# Patient Record
Sex: FEMALE | Race: WHITE | NOT HISPANIC OR LATINO | Employment: UNEMPLOYED | URBAN - METROPOLITAN AREA
[De-identification: names, ages, dates, MRNs, and addresses within clinical notes are randomized per-mention and may not be internally consistent; named-entity substitution may affect disease eponyms.]

---

## 2017-11-04 ENCOUNTER — HOSPITAL ENCOUNTER (EMERGENCY)
Facility: HOSPITAL | Age: 37
Discharge: HOME | End: 2017-11-05
Payer: COMMERCIAL

## 2017-11-04 VITALS
HEART RATE: 110 BPM | TEMPERATURE: 98.4 F | RESPIRATION RATE: 18 BRPM | OXYGEN SATURATION: 99 % | SYSTOLIC BLOOD PRESSURE: 141 MMHG | DIASTOLIC BLOOD PRESSURE: 92 MMHG

## 2017-11-04 DIAGNOSIS — J06.9 URI (UPPER RESPIRATORY INFECTION): ICD-10-CM

## 2017-11-04 DIAGNOSIS — L73.9 FOLLICULITIS: Primary | ICD-10-CM

## 2017-11-04 RX ORDER — CEPHALEXIN 500 MG/1
500 CAPSULE ORAL EVERY 6 HOURS SCHEDULED
Qty: 28 CAPSULE | Refills: 0 | Status: SHIPPED | OUTPATIENT
Start: 2017-11-04 | End: 2017-11-11

## 2017-11-05 PROCEDURE — 99282 EMERGENCY DEPT VISIT SF MDM: CPT

## 2017-11-05 NOTE — DISCHARGE INSTRUCTIONS
Folliculitis   WHAT YOU NEED TO KNOW:   Folliculitis is inflammation of your hair follicles  A hair follicle is a sac under your skin  Your hair grows out of the follicle  Folliculitis is caused by bacteria or fungus, most commonly a germ called Staph  Folliculitis can occur anywhere you have hair  DISCHARGE INSTRUCTIONS:   Medicines:   · Antibiotics: This medicine is given to fight or prevent an infection caused by bacteria  It may be given as an ointment that you apply to your skin or as a pill  Always take your antibiotics exactly as ordered by your healthcare provider  Never save antibiotics or take leftover antibiotics that were given to you for another illness  · Antifungal medicine: This medicine helps kill fungus that may be causing your folliculitis  It may be given as an cream that you apply to your skin or as a pill  · NSAIDs , such as ibuprofen, help decrease swelling, pain, and fever  This medicine is available with or without a doctor's order  NSAIDs can cause stomach bleeding or kidney problems in certain people  If you take blood thinner medicine, always ask if NSAIDs are safe for you  Always read the medicine label and follow directions  Do not give these medicines to children under 10months of age without direction from your child's healthcare provider  · Antihistamines: This medicine may be given to help decrease itching  · Take your medicine as directed  Contact your healthcare provider if you think your medicine is not helping or if you have side effects  Tell him of her if you are allergic to any medicine  Keep a list of the medicines, vitamins, and herbs you take  Include the amounts, and when and why you take them  Bring the list or the pill bottles to follow-up visits  Carry your medicine list with you in case of an emergency    Follow up with your healthcare provider or dermatologist as directed:  Write down your questions so you remember to ask them during your visits  Manage folliculitis:   · Use warm compresses:  Wet a washcloth with warm water and apply it to the infected skin area to help decrease pain and swelling  Warm compresses may also help drain pus and improve healing  · Clean the area:  Use antibacterial soap to wash the affected area  Change your washcloths and towels every day  · Avoid shaving the area: If possible, do not shave areas that have folliculitis  If you must shave, use an electric razor or new blade every time you shave  Prevent folliculitis:   · Do not share personal items:  Do not share towels, soap, or any personal items with other people  · Do not wear tight clothing:  Do not wear tight-fitting clothes that rub against and irritate your skin  · Treat skin injuries right away:  Treat injuries such as cuts and scrapes right away  Wash them with warm, soapy water, and cover the area to prevent infection  Contact your healthcare provider or dermatologist if:  · You have a fever  · You have foul-smelling pus coming from the bumps on your skin  · Your rash is spreading  · You have questions or concerns about your condition or care  Return to the emergency department if:  · You develop large areas of red, warm, tender skin around the folliculitis  · You develop boils  © 2017 [address] Information is for End User's use only and may not be sold, redistributed or otherwise used for commercial purposes  All illustrations and images included in CareNotes® are the copyrighted property of [employer]  or [address]  The above information is an  only  It is not intended as medical advice for individual conditions or treatments  Talk to your doctor, nurse or pharmacist before following any medical regimen to see if it is safe and effective for you

## 2017-11-05 NOTE — ED PROVIDER NOTES
History  Chief Complaint   Patient presents with    Rash     Pt  states red bumps all over body, states noticed yesterday   Nasal Congestion     Nasal drainage, bright yellow  Also complains of head congestion  Also dry cough       40 yo F presents today c/o rash x 2 days  States she noticed the rash incidentally on her chest while showering yesterday morning  Also noticed rash on arms, distal lower legs and back  It has improved and is less erythematous since onset  Mildly itchy, not painful  No drainage  She has not attempted any alleviating factors  Denies changes to soaps lotions or detergents  Has dogs at home and is concerned for fleas, stating she has had fleas in the past  Did not notice any bed bugs but did change and wash sheets at home  Denies contacts at home with similar rash  No history of similar symptoms  Also c/o nasal congestion x 2 days with sinus pressure and yellow nasal drainage and dry cough  Has been using OTC cough medications and taking ibuprofen with relief  No fevers, chills, CP, SOB, abd pain, n/v/d  No history of asthma but is a smoker  None       History reviewed  No pertinent past medical history  History reviewed  No pertinent surgical history  History reviewed  No pertinent family history  I have reviewed and agree with the history as documented  Social History   Substance Use Topics    Smoking status: Current Every Day Smoker     Packs/day: 0 50    Smokeless tobacco: Never Used    Alcohol use No        Review of Systems   Constitutional: Negative for chills and fever  HENT: Positive for congestion, rhinorrhea and sinus pressure  Negative for ear discharge, ear pain, sore throat and trouble swallowing  Respiratory: Positive for cough  Negative for chest tightness, shortness of breath and wheezing  Cardiovascular: Negative for chest pain and palpitations  Gastrointestinal: Negative for abdominal pain, diarrhea, nausea and vomiting  Musculoskeletal: Negative for neck pain and neck stiffness  Skin: Positive for rash  Negative for color change and wound  Neurological: Negative for weakness and numbness  Physical Exam  ED Triage Vitals [11/04/17 2321]   Temperature Pulse Respirations Blood Pressure SpO2   98 4 °F (36 9 °C) (!) 110 18 (!) 181/101 99 %      Temp Source Heart Rate Source Patient Position - Orthostatic VS BP Location FiO2 (%)   Oral Monitor Sitting Right arm --      Pain Score       No Pain           Orthostatic Vital Signs  Vitals:    11/04/17 2321 11/04/17 2347   BP: (!) 181/101 141/92   Pulse: (!) 110    Patient Position - Orthostatic VS: Sitting Sitting       Physical Exam   Constitutional: She is oriented to person, place, and time  She appears well-developed and well-nourished  She is cooperative  Non-toxic appearance  She does not have a sickly appearance  She does not appear ill  She appears distressed (anxious)  HENT:   Head: Normocephalic and atraumatic  Right Ear: Hearing, tympanic membrane, external ear and ear canal normal    Left Ear: Hearing, tympanic membrane, external ear and ear canal normal    Nose: Mucosal edema present  No rhinorrhea  Mouth/Throat: Uvula is midline, oropharynx is clear and moist and mucous membranes are normal  No tonsillar exudate  Eyes: Conjunctivae and EOM are normal  Pupils are equal, round, and reactive to light  Neck: Normal range of motion  Neck supple  Cardiovascular: Normal rate, regular rhythm and normal heart sounds  Exam reveals no gallop and no friction rub  No murmur heard  Pulmonary/Chest: Effort normal and breath sounds normal  No respiratory distress  She has no wheezes  She has no rales  Musculoskeletal: Normal range of motion  Neurological: She is alert and oriented to person, place, and time  Skin: Skin is warm and dry  Capillary refill takes less than 2 seconds  Rash noted  No petechiae noted  Rash is papular and pustular   Rash is not vesicular  She is not diaphoretic  There is erythema  Erythematous pustular rash noted on chest, upper back, right extremity and distal lower extremities; abdomen spared; no drainage, no abscess, no streaking, no vesicles   Psychiatric: Her speech is normal and behavior is normal  Her mood appears anxious  Nursing note and vitals reviewed  ED Medications  Medications - No data to display    Diagnostic Studies  Results Reviewed     None                 No orders to display              Procedures  Procedures       Phone Contacts  ED Phone Contact    ED Course  ED Course                                MDM  Number of Diagnoses or Management Options  Folliculitis: new and does not require workup  URI (upper respiratory infection): new and does not require workup  Diagnosis management comments:   40 yo F presents with rash x 2 days  Appears c/w with folliculitis, will tx with keflex  Also states she has had nasal congestion x 2 days with yellow rhinorrhea and dry cough  No fevers  Recommended supportive care, rest, fluids, OTC cold/cough medications, saline  F/u with PCP, RTED precautions given  Patient verbalizes understanding and agrees with plan  Patient Progress  Patient progress: stable    CritCare Time    Disposition  Final diagnoses: Folliculitis   URI (upper respiratory infection)     Time reflects when diagnosis was documented in both MDM as applicable and the Disposition within this note     Time User Action Codes Description Comment    11/4/2017 11:48 PM Michelle Patel Add [Y92 5] Folliculitis     14/5/8041 11:48 PM Alfonzo Patel Add [J06 9] URI (upper respiratory infection)       ED Disposition     ED Disposition Condition Comment    Discharge  Raquel Rico discharge to home/self care      Condition at discharge: Good        Follow-up Information     Follow up With Specialties Details Why Contact Info Additional Information    [facility]  Call [address] [facility] Emergency Department Emergency Medicine  If symptoms worsen - [address] [facility] [place] [address]  [phone] [facility], [address], [place], [place], [address]        Discharge Medication List as of 11/4/2017 11:50 PM      START taking these medications    Details   cephalexin (KEFLEX) 500 mg capsule Take 1 capsule by mouth every 6 (six) hours for 7 days, Starting Sat 11/4/2017, Until Sat 11/11/2017, Print           No discharge procedures on file      ED Provider  Electronically Signed by           Michelle Lebron PA-C  11/05/17 0002

## 2019-02-16 ENCOUNTER — APPOINTMENT (EMERGENCY)
Dept: CT IMAGING | Facility: HOSPITAL | Age: 39
End: 2019-02-16
Payer: COMMERCIAL

## 2019-02-16 ENCOUNTER — HOSPITAL ENCOUNTER (EMERGENCY)
Facility: HOSPITAL | Age: 39
Discharge: LEFT BEFORE DISCHARGE | End: 2019-02-16
Attending: EMERGENCY MEDICINE
Payer: COMMERCIAL

## 2019-02-16 VITALS
OXYGEN SATURATION: 96 % | BODY MASS INDEX: 47.95 KG/M2 | TEMPERATURE: 97.3 F | DIASTOLIC BLOOD PRESSURE: 95 MMHG | RESPIRATION RATE: 20 BRPM | HEART RATE: 114 BPM | HEIGHT: 64 IN | SYSTOLIC BLOOD PRESSURE: 147 MMHG | WEIGHT: 280.87 LBS

## 2019-02-16 DIAGNOSIS — R51.9 HEADACHE: Primary | ICD-10-CM

## 2019-02-16 DIAGNOSIS — S00.411A EAR CANAL ABRASION, RIGHT, INITIAL ENCOUNTER: ICD-10-CM

## 2019-02-16 PROCEDURE — 99283 EMERGENCY DEPT VISIT LOW MDM: CPT

## 2019-02-16 RX ORDER — DIPHENHYDRAMINE HYDROCHLORIDE 50 MG/ML
25 INJECTION INTRAMUSCULAR; INTRAVENOUS ONCE
Status: DISCONTINUED | OUTPATIENT
Start: 2019-02-16 | End: 2019-02-16 | Stop reason: HOSPADM

## 2019-02-16 RX ORDER — KETOROLAC TROMETHAMINE 30 MG/ML
30 INJECTION, SOLUTION INTRAMUSCULAR; INTRAVENOUS ONCE
Status: DISCONTINUED | OUTPATIENT
Start: 2019-02-16 | End: 2019-02-16 | Stop reason: HOSPADM

## 2019-02-16 RX ORDER — METOCLOPRAMIDE HYDROCHLORIDE 5 MG/ML
10 INJECTION INTRAMUSCULAR; INTRAVENOUS ONCE
Status: DISCONTINUED | OUTPATIENT
Start: 2019-02-16 | End: 2019-02-16 | Stop reason: HOSPADM

## 2019-02-16 NOTE — ED PROVIDER NOTES
History  Chief Complaint   Patient presents with    Headache     Patient reports having a headache since Monday  States, "I think it's from this lump behind my right ear "  Also reports dizziness and loss of appetite  No relief with motrin or advil  Patient presents to the emergency department for evaluation right-sided headache that began Monday without relief Tylenol Advil  Patient does not have a headache history  She has had nausea and vomiting  Denies neck pain or stiffness  Denies visual complaints but mentions some dizziness  She denies visual complaints  Denies extremity numbness or tingling  Denies chest pain or shortness of breath  Patient denies fever chills or rash  Patient also states that she developed some right ear bleeding after cleaning with a Q-tip this morning  None       Past Medical History:   Diagnosis Date    Diabetes mellitus ([facility] [address] )        History reviewed  No pertinent surgical history  History reviewed  No pertinent family history  I have reviewed and agree with the history as documented  Social History     Tobacco Use    Smoking status: Current Every Day Smoker     Packs/day: 0 50    Smokeless tobacco: Never Used   Substance Use Topics    Alcohol use: No    Drug use: No        Review of Systems   Constitutional: Negative  Negative for activity change, appetite change, chills, fatigue and fever  HENT: Positive for ear discharge  Negative for congestion, drooling, ear pain, nosebleeds, rhinorrhea, sinus pressure, sinus pain, sneezing, sore throat, trouble swallowing and voice change  Eyes: Negative  Negative for photophobia and visual disturbance  Respiratory: Negative  Negative for cough, choking, chest tightness, shortness of breath, wheezing and stridor  Cardiovascular: Negative  Negative for chest pain, palpitations and leg swelling  Gastrointestinal: Negative    Negative for abdominal distention, abdominal pain, blood in stool, constipation, diarrhea, nausea and vomiting  Endocrine: Negative  Genitourinary: Negative  Negative for difficulty urinating, dysuria, flank pain, frequency, hematuria, urgency, vaginal bleeding, vaginal discharge and vaginal pain  Musculoskeletal: Negative  Negative for arthralgias, back pain, gait problem, joint swelling, myalgias, neck pain and neck stiffness  Skin: Negative  Negative for rash and wound  Allergic/Immunologic: Negative  Neurological: Positive for dizziness  Negative for tremors, seizures, syncope, facial asymmetry, speech difficulty, weakness, light-headedness and numbness  Hematological: Negative  Does not bruise/bleed easily  Psychiatric/Behavioral: Negative  Negative for agitation, behavioral problems and confusion  Physical Exam  Physical Exam   Constitutional: She is oriented to person, place, and time  She appears well-developed and well-nourished  HENT:   Head: Normocephalic and atraumatic  Right Ear: External ear normal    Left Ear: External ear normal    Nose: Nose normal    Mouth/Throat: Oropharynx is clear and moist  No oropharyngeal exudate  Eyes: Pupils are equal, round, and reactive to light  Conjunctivae and EOM are normal  Right eye exhibits discharge  Scant blood in the right ear canal   Tympanic membrane normal   No perforation  Neck:   Right posterior lymph node which is enlarged  Cardiovascular: Normal rate, regular rhythm, normal heart sounds and intact distal pulses  Pulmonary/Chest: Effort normal and breath sounds normal  No stridor  No respiratory distress  She has no wheezes  She has no rales  She exhibits no tenderness  Abdominal: Soft  Bowel sounds are normal  She exhibits no distension and no mass  There is no tenderness  There is no rebound and no guarding  No hernia  Musculoskeletal: Normal range of motion  Lymphadenopathy:     She has cervical adenopathy     Neurological: She is alert and oriented to person, place, and time  She has normal reflexes  She displays normal reflexes  No cranial nerve deficit or sensory deficit  She exhibits normal muscle tone  Coordination normal    Skin: Skin is warm and dry  No rash noted  No erythema  No pallor  Psychiatric: She has a normal mood and affect  Her behavior is normal  Thought content normal    Nursing note and vitals reviewed  Vital Signs  ED Triage Vitals   Temperature Pulse Respirations Blood Pressure SpO2   02/16/19 1448 02/16/19 1446 02/16/19 1446 02/16/19 1446 02/16/19 1446   (!) 97 3 °F (36 3 °C) (!) 114 20 147/95 96 %      Temp Source Heart Rate Source Patient Position - Orthostatic VS BP Location FiO2 (%)   02/16/19 1448 02/16/19 1446 02/16/19 1446 02/16/19 1446 --   Oral Monitor Sitting Left arm       Pain Score       02/16/19 1446       Worst Possible Pain           Vitals:    02/16/19 1446   BP: 147/95   Pulse: (!) 114   Patient Position - Orthostatic VS: Sitting       Visual Acuity      ED Medications  Medications   metoclopramide (REGLAN) injection 10 mg (has no administration in time range)   ketorolac (TORADOL) injection 30 mg (has no administration in time range)   diphenhydrAMINE (BENADRYL) injection 25 mg (has no administration in time range)   sodium chloride 0 9 % bolus 1,000 mL (has no administration in time range)       Diagnostic Studies  Results Reviewed     None                 CT head without contrast    (Results Pending)              Procedures  Procedures       Phone Contacts  ED Phone Contact    ED Course  ED Course as of Feb 16 1637   Sat Feb 16, 2019   1635   Patient left the emergency department without notification to staff or myself  She did not get any of the medications or her or the head CT nor did she get discharge instructions  She did not talk to anyone and is uncertain as to why she left                                    MDM    Disposition  Final diagnoses:   Headache   Ear canal abrasion, right, initial encounter     Time reflects when diagnosis was documented in both MDM as applicable and the Disposition within this note     Time User Action Codes Description Comment    2/16/2019  4:36 PM Larkin Dandy Add [R51] Headache     2/16/2019  4:36 PM Marquis Tim Add [S00 411A] Ear canal abrasion, right, initial encounter       ED Disposition     ED Disposition Condition Date/Time Comment    Left from Room after Provider Exam  Sat Feb 16, 2019  4:36 PM       Follow-up Information    None         Patient's Medications    No medications on file     No discharge procedures on file      ED Provider  Electronically Signed by           Sukh Gómez MD  02/16/19 9829

## 2019-02-16 NOTE — ED NOTES
Provider consulted regarding patient's disappearance  Patient believed to have left facility        Avi Shirley RN  02/16/19 1982

## 2019-02-16 NOTE — ED NOTES
Patient noticed not in room for extended period of time  Patient not found in radiology or bathrooms  Provider made aware       Rochelle Dandy, RN  02/16/19 5803

## 2019-04-29 ENCOUNTER — TELEPHONE (OUTPATIENT)
Dept: NEUROLOGY | Facility: CLINIC | Age: 39
End: 2019-04-29

## 2020-08-21 ENCOUNTER — HOSPITAL ENCOUNTER (EMERGENCY)
Facility: HOSPITAL | Age: 40
Discharge: HOME | End: 2020-08-21
Attending: EMERGENCY MEDICINE | Admitting: EMERGENCY MEDICINE
Payer: COMMERCIAL

## 2020-08-21 VITALS
WEIGHT: 293 LBS | TEMPERATURE: 98.1 F | RESPIRATION RATE: 18 BRPM | OXYGEN SATURATION: 98 % | HEART RATE: 97 BPM | DIASTOLIC BLOOD PRESSURE: 101 MMHG | SYSTOLIC BLOOD PRESSURE: 142 MMHG | BODY MASS INDEX: 50.02 KG/M2 | HEIGHT: 64 IN

## 2020-08-21 DIAGNOSIS — K08.89 PAIN, DENTAL: Primary | ICD-10-CM

## 2020-08-21 PROCEDURE — 99284 EMERGENCY DEPT VISIT MOD MDM: CPT | Performed by: PHYSICIAN ASSISTANT

## 2020-08-21 PROCEDURE — 99283 EMERGENCY DEPT VISIT LOW MDM: CPT

## 2020-08-21 RX ORDER — IBUPROFEN 400 MG/1
400 TABLET ORAL EVERY 6 HOURS PRN
Qty: 12 TABLET | Refills: 0 | Status: SHIPPED | OUTPATIENT
Start: 2020-08-21 | End: 2020-08-24

## 2020-08-21 RX ORDER — CLINDAMYCIN HYDROCHLORIDE 300 MG/1
300 CAPSULE ORAL EVERY 6 HOURS
Qty: 40 CAPSULE | Refills: 0 | Status: SHIPPED | OUTPATIENT
Start: 2020-08-21 | End: 2020-08-31

## 2020-08-21 RX ORDER — ACETAMINOPHEN 325 MG/1
650 TABLET ORAL EVERY 6 HOURS PRN
Qty: 24 TABLET | Refills: 0 | Status: SHIPPED | OUTPATIENT
Start: 2020-08-21 | End: 2020-08-24

## 2020-08-21 RX ORDER — CLINDAMYCIN HYDROCHLORIDE 150 MG/1
300 CAPSULE ORAL ONCE
Status: COMPLETED | OUTPATIENT
Start: 2020-08-21 | End: 2020-08-21

## 2020-08-21 RX ORDER — LIDOCAINE HYDROCHLORIDE 20 MG/ML
15 SOLUTION OROPHARYNGEAL ONCE
Status: COMPLETED | OUTPATIENT
Start: 2020-08-21 | End: 2020-08-21

## 2020-08-21 RX ORDER — FLUCONAZOLE 150 MG/1
150 TABLET ORAL DAILY
Qty: 1 TABLET | Refills: 0 | Status: SHIPPED | OUTPATIENT
Start: 2020-08-21 | End: 2020-08-22

## 2020-08-21 RX ORDER — CHLORHEXIDINE GLUCONATE 0.12 MG/ML
15 RINSE ORAL 2 TIMES DAILY
Qty: 120 ML | Refills: 0 | Status: SHIPPED | OUTPATIENT
Start: 2020-08-21

## 2020-08-21 RX ADMIN — LIDOCAINE HYDROCHLORIDE 15 ML: 20 SOLUTION ORAL; TOPICAL at 03:03

## 2020-08-21 RX ADMIN — CLINDAMYCIN HYDROCHLORIDE 300 MG: 150 CAPSULE ORAL at 03:03

## 2020-08-21 NOTE — DISCHARGE INSTRUCTIONS
Take clindamycin, Orabase B, Motrin, Tylenol, Hibiclens and Diflucan is indicated  Follow-up with dental clinic  Follow-up with General Dentistry  Follow-up with PCP  Follow up emergency department symptoms persist or exacerbate

## 2020-08-21 NOTE — Clinical Note
Fredi Arellano was seen and treated in our emergency department on 8/21/2020  Diagnosis:     Jennifer Gret[place]  may return to work on return date  She may return on this date: 08/24/2020         If you have any questions or concerns, please don't hesitate to call        Rebekah Moreira PA-C    ______________________________           _______________          _______________  Hospital Representative                              Date                                Time

## 2020-08-21 NOTE — ED PROVIDER NOTES
History  Chief Complaint   Patient presents with    Facial Swelling     Patient coming in for right sided facial swelling that started yesterday with pain noted to right bottom  Patient believes it is right bottom filling that is loose  Patient is a 63-year-old female history of diabetes mellitus and no pertinent surgical history that presents emergency department with right-sided facial swelling for 1 day  Patient verbalizes associated symptomatology of losing a filling of my bottom right tooth"  Patient states that her right lower tooth pain had started after she consumed a meal   Patient is concerned with possibility of losing feeling in her bladder might tooth  Patient denies palliative factors with provocative factors of pressure to right lower tooth  Patient denies ineffective treatment  Patient denies fevers, chills, nausea, vomiting, diarrhea, constipation urinary symptoms  Patient denies recent fall or recent trauma  Patient denies sick contacts or recent travel  Patient denies chest pain, shortness breath, abdominal pain  History provided by:  Patient   used: No    Dental Pain   Location:  Lower  Lower teeth location:  31/RL 2nd molar and 30/RL 1st molar  Quality:  Aching  Severity:  Mild  Onset quality:  Gradual  Duration:  1 day  Timing:  Constant  Progression:  Worsening  Chronicity:  New  Context: dental caries    Relieved by:  Nothing  Worsened by:  Touching  Ineffective treatments:  None tried  Associated symptoms: no congestion, no difficulty swallowing, no drooling, no facial pain, no facial swelling, no fever, no gum swelling, no headaches, no neck pain, no neck swelling, no oral bleeding, no oral lesions and no trismus    Risk factors: diabetes, lack of dental care and smoking    Risk factors: no alcohol problem and no periodontal disease        None       Past Medical History:   Diagnosis Date    [facility]        History reviewed   No pertinent surgical history  History reviewed  No pertinent family history  I have reviewed and agree with the history as documented  E-Cigarette/Vaping     E-Cigarette/Vaping Substances     Social History     Tobacco Use    Smoking status: Current Every Day Smoker     Packs/day: 0 50    Smokeless tobacco: Never Used   Substance Use Topics    Alcohol use: No    Drug use: No       Review of Systems   Constitutional: Negative for activity change, appetite change, chills and fever  HENT: Positive for dental problem  Negative for congestion, drooling, facial swelling, mouth sores, postnasal drip, rhinorrhea, sinus pressure, sinus pain, sore throat and tinnitus  Eyes: Negative for photophobia and visual disturbance  Respiratory: Negative for cough, chest tightness and shortness of breath  Cardiovascular: Negative for chest pain and palpitations  Gastrointestinal: Negative for abdominal pain, constipation, diarrhea, nausea and vomiting  Genitourinary: Negative for difficulty urinating, dysuria, flank pain, frequency and urgency  Musculoskeletal: Negative for back pain, gait problem, neck pain and neck stiffness  Skin: Negative for pallor and rash  Allergic/Immunologic: Negative for environmental allergies and food allergies  Neurological: Negative for dizziness, weakness, numbness and headaches  Psychiatric/Behavioral: Negative for confusion  All other systems reviewed and are negative  Physical Exam  Physical Exam  Vitals signs and nursing note reviewed  Constitutional:       General: She is awake  Appearance: Normal appearance  She is well-developed  She is not ill-appearing, toxic-appearing or diaphoretic  Comments: BP (!) 187/112 (BP Location: Right arm)   Pulse 97   Temp 98 1 °F (36 7 °C) (Oral)   Resp 18   Ht 5' 4" (1 626 m)   Wt (!) 139 kg (305 lb 12 5 oz)   SpO2 98%   BMI 52 49 kg/m²      HENT:      Head: Normocephalic and atraumatic  Jaw:  There is normal jaw occlusion  Right Ear: Hearing, tympanic membrane, ear canal and external ear normal  No decreased hearing noted  No drainage, swelling or tenderness  No mastoid tenderness  Left Ear: Hearing, tympanic membrane, ear canal and external ear normal  No decreased hearing noted  No drainage, swelling or tenderness  No mastoid tenderness  Nose: Nose normal       Mouth/Throat:      Lips: Pink  Mouth: Mucous membranes are moist       Dentition: Dental tenderness and dental caries present  No gingival swelling or dental abscesses  Pharynx: Oropharynx is clear  Uvula midline  Eyes:      General: Lids are normal  Vision grossly intact  Right eye: No discharge  Left eye: No discharge  Extraocular Movements: Extraocular movements intact  Conjunctiva/sclera: Conjunctivae normal       Pupils: Pupils are equal, round, and reactive to light  Neck:      Musculoskeletal: Full passive range of motion without pain, normal range of motion and neck supple  Normal range of motion  No neck rigidity, spinous process tenderness or muscular tenderness  Vascular: No JVD  Trachea: Trachea and phonation normal  No tracheal tenderness or tracheal deviation  Cardiovascular:      Rate and Rhythm: Normal rate and regular rhythm  Pulses: Normal pulses  Radial pulses are 2+ on the right side and 2+ on the left side  Posterior tibial pulses are 2+ on the right side and 2+ on the left side  Heart sounds: Normal heart sounds  Pulmonary:      Effort: Pulmonary effort is normal       Breath sounds: Normal breath sounds  No stridor  No decreased breath sounds, wheezing, rhonchi or rales  Chest:      Chest wall: No tenderness  Abdominal:      General: Abdomen is flat  Bowel sounds are normal  There is no distension  Palpations: Abdomen is soft  Abdomen is not rigid  Tenderness: There is no abdominal tenderness   There is no guarding or rebound  Musculoskeletal: Normal range of motion  Lymphadenopathy:      Head:      Right side of head: No submental, submandibular, tonsillar, preauricular, posterior auricular or occipital adenopathy  Left side of head: No submental, submandibular, tonsillar, preauricular, posterior auricular or occipital adenopathy  Cervical: No cervical adenopathy  Right cervical: No superficial, deep or posterior cervical adenopathy  Left cervical: No superficial, deep or posterior cervical adenopathy  Skin:     General: Skin is warm  Capillary Refill: Capillary refill takes less than 2 seconds  Neurological:      General: No focal deficit present  Mental Status: She is alert and oriented to person, place, and time  GCS: GCS eye subscore is 4  GCS verbal subscore is 5  GCS motor subscore is 6  Sensory: No sensory deficit  Deep Tendon Reflexes: Reflexes are normal and symmetric  Reflex Scores:       Patellar reflexes are 2+ on the right side and 2+ on the left side  Psychiatric:         Mood and Affect: Mood normal          Speech: Speech normal          Behavior: Behavior normal  Behavior is cooperative  Thought Content:  Thought content normal          Judgment: Judgment normal          Vital Signs  ED Triage Vitals   Temperature Pulse Respirations Blood Pressure SpO2   08/21/20 0237 08/21/20 0235 08/21/20 0235 08/21/20 0235 08/21/20 0235   98 1 °F (36 7 °C) 97 18 (!) 187/112 98 %      Temp Source Heart Rate Source Patient Position - Orthostatic VS BP Location FiO2 (%)   08/21/20 0235 08/21/20 0235 08/21/20 0235 08/21/20 0235 --   Oral Monitor Lying Right arm       Pain Score       08/21/20 0235       5           Vitals:    08/21/20 0235 08/21/20 0303   BP: (!) 187/112 (!) 142/101   Pulse: 97    Patient Position - Orthostatic VS: Lying          Visual Acuity      ED Medications  Medications   clindamycin (CLEOCIN) capsule 300 mg (300 mg Oral Given 8/21/20 0303) Lidocaine Viscous HCl (XYLOCAINE) 2 % mucosal solution 15 mL (15 mL Oral Given 8/21/20 0303)       Diagnostic Studies  Results Reviewed     None                 No orders to display              Procedures  Procedures         ED Course  ED Course as of Aug 21 0423   Fri Aug 21, 2020   0249 1st and 2nd mandibular molar pain      0250 Patient denies offered pregnancy test                                                MDM  Number of Diagnoses or Management Options  Pain, dental: new and does not require workup     Amount and/or Complexity of Data Reviewed  Review and summarize past medical records: yes    Risk of Complications, Morbidity, and/or Mortality  Presenting problems: low  Diagnostic procedures: low  Management options: low    Patient Progress  Patient progress: stable    Patient is a 72-year-old female history of diabetes mellitus and no pertinent surgical history that presents emergency department with right-sided facial swelling for 1 day  Patient hemodynamically stable and afebrile  No fluctuant area to I and D with mild right-sided facial edema; likely due to recent manifestation of tooth pain symptomatology  Patient denied I&D  Delivered clindamycin  Delivered viscous lidocaine; patient verbalized decrease in right sided tooth pain symptoms status post medication delivery  Patient denied offered pregnancy test  Prescribed Motrin and counseled patient medication administration and side effects; and only to take Motrin if not pregnant  Prescribed Tylenol, ora base B, chlorhexidine, clindamycin, and Diflucan and counseled patient medication administration and side effects  Follow-up with PCP  Follow-up with Dentistry  Follow-up with dental clinic  Follow-up with the emergency department symptoms persist or exacerbate  Patient demonstrates verbal understanding of all discharge instructions, follow-up verbalized agreement with current treatment plan      Disposition  Final diagnoses:   Pain, dental Time reflects when diagnosis was documented in both MDM as applicable and the Disposition within this note     Time User Action Codes Description Comment    8/21/2020  3:04 AM Violet Vega Add [K08 89] Pain, dental       ED Disposition     ED Disposition Condition Date/Time Comment    Discharge Stable Fri Aug 21, 2020  3:04 AM Yessenia Avila discharge to home/self care              Follow-up Information     Follow up With Specialties Details Why Contact Info Additional Information    [facility]  Go to  for further evaluation of symptoms [address] [facility] [address] [facility] [address] [place] [address]  Dentistry Oral Surgery Call in 1 week for further evaluation of symptoms [address] [facility] [address] [facility]       Bob Sorenson MD Internal Medicine Call in 1 week for further evaluation of symptoms [id number]  [facility] Emergency Medicine Go to  As needed [address] [facility] [address],  [address], [place], [place], [address]          Discharge Medication List as of 8/21/2020  3:07 AM      START taking these medications    Details   acetaminophen (TYLENOL) 325 mg tablet Take 2 tablets (650 mg total) by mouth every 6 (six) hours as needed for mild pain for up to 3 days, Starting Fri 8/21/2020, Until Mon 8/24/2020, Normal      benzocaine (ORABASE-B) 20 % PSTE Apply 1 application to the mouth or throat 4 (four) times a day as needed for mucositis for up to 3 days, Starting Fri 8/21/2020, Until Mon 8/24/2020, Normal      chlorhexidine (PERIDEX) 0 12 % solution Apply 15 mL to the mouth or throat 2 (two) times a day, Starting Fri 8/21/2020, Normal      clindamycin (CLEOCIN) 300 MG capsule Take 1 capsule (300 mg total) by mouth every 6 (six) hours for 10 days, Starting Fri 8/21/2020, Until Mon 8/31/2020, Normal      fluconazole (DIFLUCAN) 150 mg tablet Take 1 tablet (150 mg total) by mouth daily for 1 dose, Starting Fri 8/21/2020, Until Sat 8/22/2020, Normal      ibuprofen (MOTRIN) 400 mg tablet Take 1 tablet (400 mg total) by mouth every 6 (six) hours as needed for mild pain for up to 3 days, Starting Fri 8/21/2020, Until Mon 8/24/2020, Normal           No discharge procedures on file      PDMP Review     None          ED Provider  Electronically Signed by           Margot Benites PA-C  08/21/20 [id number]       Margot Benites PA-C  08/21/20 [id number]

## 2023-09-03 ENCOUNTER — HOSPITAL ENCOUNTER (EMERGENCY)
Facility: HOSPITAL | Age: 43
Discharge: HOME | End: 2023-09-03
Attending: STUDENT IN AN ORGANIZED HEALTH CARE EDUCATION/TRAINING PROGRAM | Admitting: STUDENT IN AN ORGANIZED HEALTH CARE EDUCATION/TRAINING PROGRAM
Payer: MEDICARE

## 2023-09-03 ENCOUNTER — APPOINTMENT (EMERGENCY)
Dept: CT IMAGING | Facility: HOSPITAL | Age: 43
End: 2023-09-03
Payer: MEDICARE

## 2023-09-03 VITALS
BODY MASS INDEX: 48.06 KG/M2 | TEMPERATURE: 97.8 F | WEIGHT: 279.98 LBS | RESPIRATION RATE: 16 BRPM | DIASTOLIC BLOOD PRESSURE: 70 MMHG | HEART RATE: 90 BPM | OXYGEN SATURATION: 96 % | SYSTOLIC BLOOD PRESSURE: 132 MMHG

## 2023-09-03 DIAGNOSIS — R11.0 NAUSEA: ICD-10-CM

## 2023-09-03 DIAGNOSIS — R42 DIZZINESS: Primary | ICD-10-CM

## 2023-09-03 LAB
ALBUMIN SERPL BCP-MCNC: 4.1 G/DL (ref 3.5–5)
ALP SERPL-CCNC: 62 U/L (ref 34–104)
ALT SERPL W P-5'-P-CCNC: 18 U/L (ref 7–52)
ANION GAP SERPL CALCULATED.3IONS-SCNC: 6 MMOL/L
AST SERPL W P-5'-P-CCNC: 15 U/L (ref 13–39)
BASOPHILS # BLD AUTO: 0.05 THOUSANDS/ÂΜL (ref 0–0.1)
BASOPHILS NFR BLD AUTO: 0 % (ref 0–1)
BILIRUB SERPL-MCNC: 0.43 MG/DL (ref 0.2–1)
BUN SERPL-MCNC: 14 MG/DL (ref 5–25)
CALCIUM SERPL-MCNC: 9.2 MG/DL (ref 8.4–10.2)
CARDIAC TROPONIN I PNL SERPL HS: <2 NG/L
CHLORIDE SERPL-SCNC: 102 MMOL/L (ref 96–108)
CO2 SERPL-SCNC: 28 MMOL/L (ref 21–32)
CREAT SERPL-MCNC: 0.65 MG/DL (ref 0.6–1.3)
EOSINOPHIL # BLD AUTO: 0.16 THOUSAND/ÂΜL (ref 0–0.61)
EOSINOPHIL NFR BLD AUTO: 1 % (ref 0–6)
ERYTHROCYTE [DISTWIDTH] IN BLOOD BY AUTOMATED COUNT: 12.2 % (ref 11.6–15.1)
GFR SERPL CREATININE-BSD FRML MDRD: 109 ML/MIN/1.73SQ M
GLUCOSE SERPL-MCNC: 141 MG/DL (ref 65–140)
GLUCOSE SERPL-MCNC: 144 MG/DL (ref 65–140)
HCG SERPL QL: NEGATIVE
HCT VFR BLD AUTO: 44.9 % (ref 34.8–46.1)
HGB BLD-MCNC: 14.7 G/DL (ref 11.5–15.4)
IMM GRANULOCYTES # BLD AUTO: 0.04 THOUSAND/UL (ref 0–0.2)
IMM GRANULOCYTES NFR BLD AUTO: 0 % (ref 0–2)
LIPASE SERPL-CCNC: 20 U/L (ref 11–82)
LYMPHOCYTES # BLD AUTO: 2.64 THOUSANDS/ÂΜL (ref 0.6–4.47)
LYMPHOCYTES NFR BLD AUTO: 21 % (ref 14–44)
MCH RBC QN AUTO: 30.6 PG (ref 26.8–34.3)
MCHC RBC AUTO-ENTMCNC: 32.7 G/DL (ref 31.4–37.4)
MCV RBC AUTO: 93 FL (ref 82–98)
MONOCYTES # BLD AUTO: 0.67 THOUSAND/ÂΜL (ref 0.17–1.22)
MONOCYTES NFR BLD AUTO: 5 % (ref 4–12)
NEUTROPHILS # BLD AUTO: 9.25 THOUSANDS/ÂΜL (ref 1.85–7.62)
NEUTS SEG NFR BLD AUTO: 73 % (ref 43–75)
NRBC BLD AUTO-RTO: 0 /100 WBCS
PLATELET # BLD AUTO: 302 THOUSANDS/UL (ref 149–390)
PMV BLD AUTO: 9.3 FL (ref 8.9–12.7)
POTASSIUM SERPL-SCNC: 4.3 MMOL/L (ref 3.5–5.3)
PROT SERPL-MCNC: 7.1 G/DL (ref 6.4–8.4)
RBC # BLD AUTO: 4.81 MILLION/UL (ref 3.81–5.12)
SODIUM SERPL-SCNC: 136 MMOL/L (ref 135–147)
WBC # BLD AUTO: 12.81 THOUSAND/UL (ref 4.31–10.16)

## 2023-09-03 PROCEDURE — 84703 CHORIONIC GONADOTROPIN ASSAY: CPT

## 2023-09-03 PROCEDURE — G1004 CDSM NDSC: HCPCS

## 2023-09-03 PROCEDURE — 70450 CT HEAD/BRAIN W/O DYE: CPT

## 2023-09-03 PROCEDURE — 82948 REAGENT STRIP/BLOOD GLUCOSE: CPT

## 2023-09-03 PROCEDURE — 99285 EMERGENCY DEPT VISIT HI MDM: CPT

## 2023-09-03 PROCEDURE — 99284 EMERGENCY DEPT VISIT MOD MDM: CPT

## 2023-09-03 PROCEDURE — 83690 ASSAY OF LIPASE: CPT

## 2023-09-03 PROCEDURE — 96374 THER/PROPH/DIAG INJ IV PUSH: CPT

## 2023-09-03 PROCEDURE — 96361 HYDRATE IV INFUSION ADD-ON: CPT

## 2023-09-03 PROCEDURE — 80053 COMPREHEN METABOLIC PANEL: CPT

## 2023-09-03 PROCEDURE — 93005 ELECTROCARDIOGRAM TRACING: CPT

## 2023-09-03 PROCEDURE — 84484 ASSAY OF TROPONIN QUANT: CPT

## 2023-09-03 PROCEDURE — 36415 COLL VENOUS BLD VENIPUNCTURE: CPT

## 2023-09-03 PROCEDURE — 85025 COMPLETE CBC W/AUTO DIFF WBC: CPT

## 2023-09-03 RX ORDER — KETOROLAC TROMETHAMINE 30 MG/ML
15 INJECTION, SOLUTION INTRAMUSCULAR; INTRAVENOUS ONCE
Status: DISCONTINUED | OUTPATIENT
Start: 2023-09-03 | End: 2023-09-03

## 2023-09-03 RX ORDER — PSEUDOEPHEDRINE HCL 30 MG
100 TABLET ORAL 2 TIMES DAILY
COMMUNITY
Start: 2023-08-30 | End: 2024-08-29

## 2023-09-03 RX ORDER — LISINOPRIL 5 MG/1
5 TABLET ORAL DAILY
COMMUNITY
Start: 2023-07-07 | End: 2023-10-05

## 2023-09-03 RX ORDER — ONDANSETRON 4 MG/1
4 TABLET, FILM COATED ORAL EVERY 8 HOURS PRN
COMMUNITY
Start: 2023-08-30

## 2023-09-03 RX ORDER — MECLIZINE HCL 12.5 MG/1
25 TABLET ORAL ONCE
Status: DISCONTINUED | OUTPATIENT
Start: 2023-09-03 | End: 2023-09-03 | Stop reason: HOSPADM

## 2023-09-03 RX ORDER — POLYETHYLENE GLYCOL 3350
17 POWDER (GRAM) MISCELLANEOUS DAILY
COMMUNITY
Start: 2023-08-30 | End: 2024-08-29

## 2023-09-03 RX ORDER — ONDANSETRON 2 MG/ML
4 INJECTION INTRAMUSCULAR; INTRAVENOUS ONCE
Status: COMPLETED | OUTPATIENT
Start: 2023-09-03 | End: 2023-09-03

## 2023-09-03 RX ADMIN — ONDANSETRON 4 MG: 2 INJECTION INTRAMUSCULAR; INTRAVENOUS at 03:14

## 2023-09-03 RX ADMIN — SODIUM CHLORIDE 1000 ML: 0.9 INJECTION, SOLUTION INTRAVENOUS at 03:14

## 2023-09-03 NOTE — ED NOTES
Patient discharged home, ambulatory to waiting room with mother.       Breana Kauffman RN  09/03/23 [id number]

## 2023-09-03 NOTE — ED PROVIDER NOTES
History  Chief Complaint   Patient presents with   • Dizziness     Patient arrived via EMS for eval of dizziness and nausea. Pt states she had Ozempic on Friday. Hx: DM. The patient is a 44-year-old female with past medical history of diabetes mellitus, presents for evaluation of dizziness. She states shortly prior to arrival she was sitting in her kitchen talking to her friend when she had a sudden onset of room spinning dizziness. Associated symptoms included nausea and head/sinus pressure. She was able to ambulate to her room after which she laid down and put her feet up against the wall. She states her symptoms initially did go away, however shortly later returned. At that point she called for EMS. At this time she reports nausea and states that the room is spinning around her. No associated photophobia, visual changes, extremity weakness, paresthesias, vomiting, abdominal pain, diarrhea, constipation, chest pain, shortness of breath, diaphoresis, cold-like symptoms, or F/C. The patient does state that she recently started Ozempic 3 weeks ago and last gave herself a dose yesterday. She has not had any side effects from the Ozempic thus far. No suspicious food intake tonight or known sick contacts with similar symptoms. The patient did state that she had similar symptoms frequently when she was in her late teens and was told that her symptoms were related to panic attacks. Prior to tonight she has not experienced these symptoms for nearly 20 years. Prior to Admission Medications   Prescriptions Last Dose Informant Patient Reported? Taking?    Docusate Sodium (DSS) 100 MG CAPS   Yes Yes   Sig: Take 100 mg by mouth 2 (two) times a day   Polyethylene Glycol 3350 POWD   Yes Yes   Sig: Take 17 g by mouth daily   chlorhexidine (PERIDEX) 0.12 % solution   No No   Sig: Apply 15 mL to the mouth or throat 2 (two) times a day   ibuprofen (MOTRIN) 400 mg tablet   No No   Sig: Take 1 tablet (400 mg total) by mouth every 6 (six) hours as needed for mild pain for up to 3 days   lisinopril (ZESTRIL) 5 mg tablet   Yes Yes   Sig: Take 5 mg by mouth daily   ondansetron (ZOFRAN) 4 mg tablet   Yes Yes   Sig: Take 4 mg by mouth every 8 (eight) hours as needed   semaglutide, 0.25 or 0.5 mg/dose, (Ozempic, 0.25 or 0.5 MG/DOSE,) 2 mg/1.5 mL injection pen   Yes Yes   Sig: Inject 0.25 mg under the skin Once a week      Facility-Administered Medications: None       Past Medical History:   Diagnosis Date   • Diabetes mellitus ([address])        History reviewed. No pertinent surgical history. History reviewed. No pertinent family history. I have reviewed and agree with the history as documented. E-Cigarette/Vaping     E-Cigarette/Vaping Substances     Social History     Tobacco Use   • Smoking status: Every Day     Packs/day: 0.50     Types: Cigarettes   • Smokeless tobacco: Never   Substance Use Topics   • Alcohol use: No   • Drug use: No       Review of Systems   Constitutional: Negative for chills, diaphoresis, fatigue and fever. HENT: Negative for congestion, ear pain, rhinorrhea and sore throat. Eyes: Negative for pain and visual disturbance. Respiratory: Negative for cough and shortness of breath. Cardiovascular: Negative for chest pain and palpitations. Gastrointestinal: Positive for vomiting. Negative for abdominal pain, constipation, diarrhea and nausea. Genitourinary: Negative for decreased urine volume, difficulty urinating, dysuria, flank pain, frequency, hematuria and urgency. Musculoskeletal: Negative for arthralgias, back pain and myalgias. Skin: Negative for color change and rash. Neurological: Positive for dizziness and headaches. Negative for seizures, syncope and weakness. All other systems reviewed and are negative. Physical Exam  Physical Exam  Vitals and nursing note reviewed. Constitutional:       General: She is awake. She is not in acute distress.      Appearance: Normal appearance. She is well-developed. She is obese. She is not toxic-appearing or diaphoretic. HENT:      Head: Normocephalic and atraumatic. Right Ear: Tympanic membrane, ear canal and external ear normal. No middle ear effusion. There is no impacted cerumen. Left Ear: Tympanic membrane, ear canal and external ear normal.  No middle ear effusion. There is no impacted cerumen. Nose: Nose normal. No mucosal edema, congestion or rhinorrhea. Mouth/Throat:      Lips: Pink. Mouth: Mucous membranes are moist.      Tongue: Tongue does not deviate from midline. Pharynx: Oropharynx is clear. Uvula midline. Eyes:      General: Lids are normal. Vision grossly intact. Gaze aligned appropriately. No visual field deficit. Extraocular Movements: Extraocular movements intact. Right eye: No nystagmus. Left eye: No nystagmus. Conjunctiva/sclera: Conjunctivae normal.      Pupils: Pupils are equal, round, and reactive to light. Cardiovascular:      Rate and Rhythm: Normal rate and regular rhythm. Heart sounds: Normal heart sounds, S1 normal and S2 normal. No murmur heard. No friction rub. No gallop. Pulmonary:      Effort: Pulmonary effort is normal. No respiratory distress. Breath sounds: Normal breath sounds and air entry. No wheezing, rhonchi or rales. Abdominal:      General: Abdomen is flat. Palpations: Abdomen is soft. Tenderness: There is no abdominal tenderness. There is no guarding or rebound. Hernia: A hernia is present. Musculoskeletal:      Cervical back: Normal, full passive range of motion without pain and neck supple. No rigidity or crepitus. No spinous process tenderness or muscular tenderness. Thoracic back: Tenderness (Right paraspinal musculature) present. No spasms or bony tenderness. Lumbar back: Normal. No spasms, tenderness or bony tenderness. Skin:     General: Skin is warm and dry.       Capillary Refill: Capillary refill takes less than 2 seconds. Coloration: Skin is not pale. Findings: No rash. Neurological:      General: No focal deficit present. Mental Status: She is alert and oriented to person, place, and time. GCS: GCS eye subscore is 4. GCS verbal subscore is 5. GCS motor subscore is 6. Cranial Nerves: Cranial nerves 2-12 are intact. No dysarthria or facial asymmetry. Sensory: Sensation is intact. Motor: Motor function is intact. No weakness, tremor or abnormal muscle tone. Coordination: Coordination is intact. Finger-Nose-Finger Test and Heel to Soliman Test normal.      Gait: Gait is intact. Gait normal.   Psychiatric:         Attention and Perception: Attention normal.         Mood and Affect: Mood normal.         Speech: Speech normal.         Behavior: Behavior is cooperative.          Vital Signs  ED Triage Vitals [09/03/23 0209]   Temperature Pulse Respirations Blood Pressure SpO2   97.8 °F (36.6 °C) 90 16 132/70 96 %      Temp Source Heart Rate Source Patient Position - Orthostatic VS BP Location FiO2 (%)   Oral Monitor Lying Left arm --      Pain Score       No Pain           Vitals:    09/03/23 0209   BP: 132/70   Pulse: 90   Patient Position - Orthostatic VS: Lying         ED Medications  Medications   meclizine (ANTIVERT) tablet 25 mg (25 mg Oral Not Given 9/3/23 0333)   ondansetron (ZOFRAN) injection 4 mg (4 mg Intravenous Given 9/3/23 0314)   sodium chloride 0.9 % bolus 1,000 mL (0 mL Intravenous Stopped 9/3/23 0414)       Diagnostic Studies  Results Reviewed     Procedure Component Value Units Date/Time    hCG, qualitative pregnancy [[id number]]  (Normal) Collected: 09/03/23 0315    Lab Status: Final result Specimen: Blood from Arm, Right Updated: 09/03/23 0430     Preg, Serum Negative    HS Troponin 0hr (reflex protocol) [[id number]]  (Normal) Collected: 09/03/23 0315    Lab Status: Final result Specimen: Blood from Arm, Right Updated: 09/03/23 0352     hs TnI 0hr <2 ng/L     Comprehensive metabolic panel [[id number]]  (Abnormal) Collected: 09/03/23 0315    Lab Status: Final result Specimen: Blood from Arm, Right Updated: 09/03/23 0344     Sodium 136 mmol/L      Potassium 4.3 mmol/L      Chloride 102 mmol/L      CO2 28 mmol/L      ANION GAP 6 mmol/L      BUN 14 mg/dL      Creatinine 0.65 mg/dL      Glucose 141 mg/dL      Calcium 9.2 mg/dL      AST 15 U/L      ALT 18 U/L      Alkaline Phosphatase 62 U/L      Total Protein 7.1 g/dL      Albumin 4.1 g/dL      Total Bilirubin 0.43 mg/dL      eGFR 109 ml/min/1.73sq m     Narrative:      National Kid[employer] Foundation guidelines for Chronic Kidney Disease (CKD):   •  Stage 1 with normal or high GFR (GFR > 90 mL/min/1.73 square meters)  •  Stage 2 Mild CKD (GFR = 60-89 mL/min/1.73 square meters)  •  Stage 3A Moderate CKD (GFR = 45-59 mL/min/1.73 square meters)  •  Stage 3B Moderate CKD (GFR = 30-44 mL/min/1.73 square meters)  •  Stage 4 Severe CKD (GFR = 15-29 mL/min/1.73 square meters)  •  Stage 5 End Stage CKD (GFR <15 mL/min/1.73 square meters)  Note: GFR calculation is accurate only with a steady state creatinine    Lipase [[id number]]  (Normal) Collected: 09/03/23 0315    Lab Status: Final result Specimen: Blood from Arm, Right Updated: 09/03/23 0344     Lipase 20 u/L     CBC and differential [[id number]]  (Abnormal) Collected: 09/03/23 0315    Lab Status: Final result Specimen: Blood from Arm, Right Updated: 09/03/23 0323     WBC 12.81 Thousand/uL      RBC 4.81 Million/uL      Hemoglobin 14.7 g/dL      Hematocrit 44.9 %      MCV 93 fL      MCH 30.6 pg      MCHC 32.7 g/dL      RDW 12.2 %      MPV 9.3 fL      Platelets 493 Thousands/uL      nRBC 0 /100 WBCs      Neutrophils Relative 73 %      Immat GRANS % 0 %      Lymphocytes Relative 21 %      Monocytes Relative 5 %      Eosinophils Relative 1 %      Basophils Relative 0 %      Neutrophils Absolute 9.25 Thousands/µL      Immature Grans Absolute 0.04 Thousand/uL      Lymphocytes Absolute 2.64 Thousands/µL      Monocytes Absolute 0.67 Thousand/µL      Eosinophils Absolute 0.16 Thousand/µL      Basophils Absolute 0.05 Thousands/µL     UA w Reflex to Microscopic w Reflex to Culture [[id number]]     Lab Status: No result Specimen: Urine     Fingerstick Glucose (POCT) [[id number]]  (Abnormal) Collected: 09/03/23 0225    Lab Status: Final result Updated: 09/03/23 0226     POC Glucose 144 mg/dl                  CT head without contrast   ED Interpretation by [facility] IDALIA Negro (09/03 4790)   No acute intracranial abnormality. Final Result by Christian Martinez MD (09/03 0445)      No acute intracranial abnormality. Workstation performed: [id number]                    Procedures  ECG 12 Lead Documentation Only    Date/Time: 9/3/2023 3:40 AM    Performed by: Leigh Ann Cabral PA-C  Authorized by: Leigh Ann Cabral PA-C    ECG reviewed by me, the ED Provider: yes    Patient location:  ED  Previous ECG:     Previous ECG:  Unavailable    Comparison to cardiac monitor: Yes    Interpretation:     Interpretation: normal    Rate:     ECG rate:  88    ECG rate assessment: normal    Rhythm:     Rhythm: sinus rhythm    Ectopy:     Ectopy: none    QRS:     QRS axis:  Normal    QRS intervals:  Normal  Conduction:     Conduction: normal    ST segments:     ST segments:  Normal  T waves:     T waves: normal    Comments:      CT interval: 170ms  QRS duration: 84ms  QT/QTc: 378/457ms         ED Course  ED Course as of 09/03/23 0508   Sun Sep 03, 2023   0333 Temperature: 97.8 °F (36.6 °C)   0333 Pulse: 90   0333 WBC(!): 12.81   0448 CT head without contrast  IMPRESSION:  No acute intracranial abnormality.        HEART Risk Score    Flowsheet Row Most Recent Value   Heart Score Risk Calculator    History 0 Filed at: 09/03/2023 0406   ECG 0 Filed at: 09/03/2023 0406   Age 0 Filed at: 09/03/2023 0406   Risk Factors 1 Filed at: 09/03/2023 0406   Troponin 0 Filed at: 09/03/2023 0406   HEART Score 1 Filed at: 09/03/2023 0406           Stroke Assessment     Row Name 09/03/23 0232             NIH Stroke Scale    Interval Baseline      Level of Consciousness (1a.) 0      LOC Questions (1b.) 0      LOC Commands (1c.) 0      Best Gaze (2.) 0      Visual (3.) 0      Facial Palsy (4.) 0      Motor Arm, Left (5a.) 0      Motor Arm, Right (5b.) 0      Motor Leg, Left (6a.) 0      Motor Leg, Right (6b.) 0      Limb Ataxia (7.) 0      Sensory (8.) 0      Best Language (9.) 0      Dysarthria (10.) 0      Extinction and Inattention (11.) (Formerly Neglect) 0      Total 0                  Medical Decision Making  Patient presents for evaluation of room spinning dizziness,  Nausea, and head/sinus pressure. On arrival she is alert and oriented x4 with a GCS of 15 and no focal neurologic deficits. Differential diagnosis includes but is not limited to BPPV, cerumen impaction, sinusitis, dehydration, hypoglycemia, metabolic abnormality, cardiac abnormality, intracranial process, adverse medication reaction, or anxiety; doubt posterior CVA or acute surgical intra-abdominal process. On my personal interpretation of the EKG the patient is in normal sinus rhythm at 80 bpm with no ischemic changes or ectopy. Troponin was <2, giving the patient a heart score of 1. Doubt her symptoms are cardiac in nature. Labs showed mild leukocytosis but were otherwise unremarkable. CT of the head negative for acute pathology. Zofran and meclizine were ordered, however the patient had resolution of her dizziness with the Zofran and declined meclizine. Reviewed all results with the patient and her mother who is at bedside. All of their questions were answered and they verbalized understanding of the return precautions. Follow-up with PCP, and return to the ED in the interim with new or worsening symptoms.     Dizziness: acute illness or injury  Nausea: acute illness or injury  Amount and/or Complexity of Data Reviewed  External Data Reviewed: labs and notes. Labs: ordered. Decision-making details documented in ED Course. Radiology: ordered and independent interpretation performed. Decision-making details documented in ED Course. ECG/medicine tests: ordered and independent interpretation performed. Risk  Prescription drug management. Disposition  Final diagnoses:   Dizziness   Nausea     Time reflects when diagnosis was documented in both MDM as applicable and the Disposition within this note     Time User Action Codes Description Comment    9/3/2023  4:52 AM Mary Negro [R42] Dizziness     9/3/2023  4:53 AM Mary Negro Add [R11.0] Nausea       ED Disposition     ED Disposition   Discharge    Condition   Stable    Date/Time   Sun Sep 3, 2023  4:53 AM    Comment   Valerie Solis discharge to home/self care.                Follow-up Information     Follow up With Specialties Details Why MD [facility]   [address] [place] [id number]  [phone]            Discharge Medication List as of 9/3/2023  4:53 AM      CONTINUE these medications which have NOT CHANGED    Details   Docusate Sodium (DSS) 100 MG CAPS Take 100 mg by mouth 2 (two) times a day, Starting Wed 8/30/2023, Until Thu 8/29/2024, Historical Med      lisinopril (ZESTRIL) 5 mg tablet Take 5 mg by mouth daily, Starting Fri 7/7/2023, Until Thu 10/5/2023, Historical Med      ondansetron (ZOFRAN) 4 mg tablet Take 4 mg by mouth every 8 (eight) hours as needed, Starting Wed 8/30/2023, Historical Med      Polyethylene Glycol 3350 POWD Take 17 g by mouth daily, Starting Wed 8/30/2023, Until Thu 8/29/2024, Historical Med      semaglutide, 0.25 or 0.5 mg/dose, (Ozempic, 0.25 or 0.5 MG/DOSE,) 2 mg/1.5 mL injection pen Inject 0.25 mg under the skin Once a week, Starting Tue 8/8/2023, Historical Med      chlorhexidine (PERIDEX) 0.12 % solution Apply 15 mL to the mouth or throat 2 (two) times a day, Starting Fri 8/21/2020, Normal ibuprofen (MOTRIN) 400 mg tablet Take 1 tablet (400 mg total) by mouth every 6 (six) hours as needed for mild pain for up to 3 days, Starting Fri 8/21/2020, Until Mon 8/24/2020, Normal             No discharge procedures on file.     PDMP Review     None          ED Provider  Electronically Signed by           Rivera Allen PA-C  09/03/23 5890

## 2023-09-06 LAB
ATRIAL RATE: 88 BPM
P AXIS: 59 DEGREES
PR INTERVAL: 170 MS
QRS AXIS: 54 DEGREES
QRSD INTERVAL: 84 MS
QT INTERVAL: 378 MS
QTC INTERVAL: 457 MS
T WAVE AXIS: 59 DEGREES
VENTRICULAR RATE: 88 BPM

## 2023-09-06 PROCEDURE — 93010 ELECTROCARDIOGRAM REPORT: CPT | Performed by: INTERNAL MEDICINE

## 2024-08-29 ENCOUNTER — APPOINTMENT (EMERGENCY)
Dept: CT IMAGING | Facility: HOSPITAL | Age: 44
End: 2024-08-29
Payer: MEDICARE

## 2024-08-29 ENCOUNTER — APPOINTMENT (EMERGENCY)
Dept: RADIOLOGY | Facility: HOSPITAL | Age: 44
End: 2024-08-29
Payer: MEDICARE

## 2024-08-29 ENCOUNTER — HOSPITAL ENCOUNTER (EMERGENCY)
Facility: HOSPITAL | Age: 44
Discharge: HOME | End: 2024-08-29
Attending: EMERGENCY MEDICINE
Payer: MEDICARE

## 2024-08-29 ENCOUNTER — TELEPHONE (OUTPATIENT)
Dept: CT IMAGING | Facility: HOSPITAL | Age: 44
End: 2024-08-29

## 2024-08-29 VITALS
DIASTOLIC BLOOD PRESSURE: 100 MMHG | BODY MASS INDEX: 47.64 KG/M2 | WEIGHT: 277.56 LBS | HEART RATE: 90 BPM | TEMPERATURE: 98 F | RESPIRATION RATE: 20 BRPM | OXYGEN SATURATION: 98 % | SYSTOLIC BLOOD PRESSURE: 154 MMHG

## 2024-08-29 DIAGNOSIS — K13.0 CHEILITIS: Primary | ICD-10-CM

## 2024-08-29 DIAGNOSIS — R91.8 HILAR MASS: ICD-10-CM

## 2024-08-29 DIAGNOSIS — E11.10 DKA (DIABETIC KETOACIDOSIS) (HCC): ICD-10-CM

## 2024-08-29 LAB
ALBUMIN SERPL BCG-MCNC: 3.9 G/DL (ref 3.5–5)
ALP SERPL-CCNC: 100 U/L (ref 34–104)
ALT SERPL W P-5'-P-CCNC: 29 U/L (ref 7–52)
ANION GAP SERPL CALCULATED.3IONS-SCNC: 7 MMOL/L (ref 4–13)
APTT PPP: 23 SECONDS (ref 23–34)
AST SERPL W P-5'-P-CCNC: 20 U/L (ref 13–39)
B-OH-BUTYR SERPL-MCNC: 0.39 MMOL/L (ref 0.02–0.27)
BASE EX.OXY STD BLDV CALC-SCNC: 88.3 % (ref 60–80)
BASE EXCESS BLDV CALC-SCNC: 0.5 MMOL/L
BASOPHILS # BLD AUTO: 0.06 THOUSANDS/ÂΜL (ref 0–0.1)
BASOPHILS NFR BLD AUTO: 1 % (ref 0–1)
BILIRUB SERPL-MCNC: 0.69 MG/DL (ref 0.2–1)
BUN SERPL-MCNC: 10 MG/DL (ref 5–25)
CALCIUM SERPL-MCNC: 9.2 MG/DL (ref 8.4–10.2)
CARDIAC TROPONIN I PNL SERPL HS: 3 NG/L
CHLORIDE SERPL-SCNC: 98 MMOL/L (ref 96–108)
CO2 SERPL-SCNC: 25 MMOL/L (ref 21–32)
CREAT SERPL-MCNC: 0.54 MG/DL (ref 0.6–1.3)
EOSINOPHIL # BLD AUTO: 0.2 THOUSAND/ÂΜL (ref 0–0.61)
EOSINOPHIL NFR BLD AUTO: 2 % (ref 0–6)
ERYTHROCYTE [DISTWIDTH] IN BLOOD BY AUTOMATED COUNT: 12.5 % (ref 11.6–15.1)
GFR SERPL CREATININE-BSD FRML MDRD: 115 ML/MIN/1.73SQ M
GLUCOSE SERPL-MCNC: 390 MG/DL (ref 65–140)
GLUCOSE SERPL-MCNC: 429 MG/DL (ref 65–140)
HCO3 BLDV-SCNC: 24 MMOL/L (ref 24–30)
HCT VFR BLD AUTO: 45.6 % (ref 34.8–46.1)
HGB BLD-MCNC: 15.6 G/DL (ref 11.5–15.4)
IMM GRANULOCYTES # BLD AUTO: 0.05 THOUSAND/UL (ref 0–0.2)
IMM GRANULOCYTES NFR BLD AUTO: 1 % (ref 0–2)
INR PPP: 0.81 (ref 0.85–1.19)
LACTATE SERPL-SCNC: 1.2 MMOL/L (ref 0.5–2)
LYMPHOCYTES # BLD AUTO: 3.16 THOUSANDS/ÂΜL (ref 0.6–4.47)
LYMPHOCYTES NFR BLD AUTO: 32 % (ref 14–44)
MCH RBC QN AUTO: 31.3 PG (ref 26.8–34.3)
MCHC RBC AUTO-ENTMCNC: 34.2 G/DL (ref 31.4–37.4)
MCV RBC AUTO: 92 FL (ref 82–98)
MONOCYTES # BLD AUTO: 0.75 THOUSAND/ÂΜL (ref 0.17–1.22)
MONOCYTES NFR BLD AUTO: 8 % (ref 4–12)
NEUTROPHILS # BLD AUTO: 5.56 THOUSANDS/ÂΜL (ref 1.85–7.62)
NEUTS SEG NFR BLD AUTO: 56 % (ref 43–75)
NRBC BLD AUTO-RTO: 0 /100 WBCS
O2 CT BLDV-SCNC: 20.8 ML/DL
PCO2 BLDV: 35.7 MM HG (ref 42–50)
PH BLDV: 7.45 [PH] (ref 7.3–7.4)
PLATELET # BLD AUTO: 251 THOUSANDS/UL (ref 149–390)
PMV BLD AUTO: 10.3 FL (ref 8.9–12.7)
PO2 BLDV: 62.6 MM HG (ref 35–45)
POTASSIUM SERPL-SCNC: 4.1 MMOL/L (ref 3.5–5.3)
PROT SERPL-MCNC: 7.2 G/DL (ref 6.4–8.4)
PROTHROMBIN TIME: 11.9 SECONDS (ref 12.3–15)
RBC # BLD AUTO: 4.98 MILLION/UL (ref 3.81–5.12)
SODIUM SERPL-SCNC: 130 MMOL/L (ref 135–147)
TSH SERPL DL<=0.05 MIU/L-ACNC: 1.43 UIU/ML (ref 0.45–4.5)
WBC # BLD AUTO: 9.78 THOUSAND/UL (ref 4.31–10.16)

## 2024-08-29 PROCEDURE — 84484 ASSAY OF TROPONIN QUANT: CPT | Performed by: PHYSICIAN ASSISTANT

## 2024-08-29 PROCEDURE — 82010 KETONE BODYS QUAN: CPT | Performed by: PHYSICIAN ASSISTANT

## 2024-08-29 PROCEDURE — 85025 COMPLETE CBC W/AUTO DIFF WBC: CPT | Performed by: PHYSICIAN ASSISTANT

## 2024-08-29 PROCEDURE — 82948 REAGENT STRIP/BLOOD GLUCOSE: CPT

## 2024-08-29 PROCEDURE — 99285 EMERGENCY DEPT VISIT HI MDM: CPT | Performed by: PHYSICIAN ASSISTANT

## 2024-08-29 PROCEDURE — 36415 COLL VENOUS BLD VENIPUNCTURE: CPT | Performed by: PHYSICIAN ASSISTANT

## 2024-08-29 PROCEDURE — 83605 ASSAY OF LACTIC ACID: CPT | Performed by: PHYSICIAN ASSISTANT

## 2024-08-29 PROCEDURE — 82805 BLOOD GASES W/O2 SATURATION: CPT | Performed by: PHYSICIAN ASSISTANT

## 2024-08-29 PROCEDURE — 85730 THROMBOPLASTIN TIME PARTIAL: CPT | Performed by: PHYSICIAN ASSISTANT

## 2024-08-29 PROCEDURE — 93005 ELECTROCARDIOGRAM TRACING: CPT

## 2024-08-29 PROCEDURE — 80053 COMPREHEN METABOLIC PANEL: CPT | Performed by: PHYSICIAN ASSISTANT

## 2024-08-29 PROCEDURE — 99285 EMERGENCY DEPT VISIT HI MDM: CPT

## 2024-08-29 PROCEDURE — 87040 BLOOD CULTURE FOR BACTERIA: CPT | Performed by: PHYSICIAN ASSISTANT

## 2024-08-29 PROCEDURE — 96360 HYDRATION IV INFUSION INIT: CPT

## 2024-08-29 PROCEDURE — 84443 ASSAY THYROID STIM HORMONE: CPT | Performed by: PHYSICIAN ASSISTANT

## 2024-08-29 PROCEDURE — 71045 X-RAY EXAM CHEST 1 VIEW: CPT

## 2024-08-29 PROCEDURE — 96361 HYDRATE IV INFUSION ADD-ON: CPT

## 2024-08-29 PROCEDURE — 85610 PROTHROMBIN TIME: CPT | Performed by: PHYSICIAN ASSISTANT

## 2024-08-29 PROCEDURE — 96372 THER/PROPH/DIAG INJ SC/IM: CPT

## 2024-08-29 RX ORDER — CLOTRIMAZOLE AND BETAMETHASONE DIPROPIONATE 10; .64 MG/G; MG/G
CREAM TOPICAL ONCE
Status: DISCONTINUED | OUTPATIENT
Start: 2024-08-29 | End: 2024-08-29 | Stop reason: HOSPADM

## 2024-08-29 RX ADMIN — SODIUM CHLORIDE 2000 ML: 0.9 INJECTION, SOLUTION INTRAVENOUS at 13:25

## 2024-08-29 RX ADMIN — INSULIN HUMAN 4 UNITS: 100 INJECTION, SOLUTION PARENTERAL at 15:30

## 2024-08-29 NOTE — ED PROVIDER NOTES
History  Chief Complaint   Patient presents with    Hyperglycemia - Symptomatic     Pt states she went to Urgent Care today because she has been having blurry vision since Sunday. States that her blood glucose was 446 at Urgent Care and was advised to come to ER. Pt also states she is very thirsty and urinating frequently.       History provided by:  Patient   used: No    Hyperglycemia - Symptomatic  Blood sugar level PTA:  426  Severity:  Moderate  Onset quality:  Gradual  Duration: 8 months.  Chronicity:  Recurrent  Diabetes status:  Controlled with diet and controlled with oral medications  Current diabetic therapy:  None, patient stopped Ozempic and Metformin  Time since last antidiabetic medication:  7 months  Context: noncompliance    Relieved by:  Nothing  Ineffective treatments:  Diet  Associated symptoms: blurred vision, dehydration, fatigue, increased thirst and polyuria    Associated symptoms: no abdominal pain, no altered mental status, no chest pain, no confusion, no diaphoresis, no dizziness, no dysuria, no fever, no increased appetite, no malaise, no nausea, no shortness of breath, no syncope, no vomiting, no weakness and no weight change    Fatigue:     Severity:  Mild    Timing:  Constant    Progression:  Waxing and waning  Risk factors: obesity    Risk factors: no family hx of diabetes, no hx of DKA, no pancreatic disease, no pregnancy and no recent steroid use        Prior to Admission Medications   Prescriptions Last Dose Informant Patient Reported? Taking?   Docusate Sodium (DSS) 100 MG CAPS   Yes No   Sig: Take 100 mg by mouth 2 (two) times a day   Polyethylene Glycol 3350 POWD   Yes No   Sig: Take 17 g by mouth daily   chlorhexidine (PERIDEX) 0.12 % solution   No No   Sig: Apply 15 mL to the mouth or throat 2 (two) times a day   ibuprofen (MOTRIN) 400 mg tablet   No No   Sig: Take 1 tablet (400 mg total) by mouth every 6 (six) hours as needed for mild pain for up to 3 days    lisinopril (ZESTRIL) 5 mg tablet   Yes No   Sig: Take 5 mg by mouth daily   ondansetron (ZOFRAN) 4 mg tablet   Yes No   Sig: Take 4 mg by mouth every 8 (eight) hours as needed   semaglutide, 0.25 or 0.5 mg/dose, (Ozempic, 0.25 or 0.5 MG/DOSE,) 2 mg/1.5 mL injection pen   Yes No   Sig: Inject 0.25 mg under the skin Once a week      Facility-Administered Medications: None       Past Medical History:   Diagnosis Date    Diabetes mellitus (HCC)        History reviewed. No pertinent surgical history.    History reviewed. No pertinent family history.  I have reviewed and agree with the history as documented.    E-Cigarette/Vaping    E-Cigarette Use Never User      E-Cigarette/Vaping Substances     Social History     Tobacco Use    Smoking status: Every Day     Current packs/day: 1.00     Types: Cigarettes    Smokeless tobacco: Never   Vaping Use    Vaping status: Never Used   Substance Use Topics    Alcohol use: No    Drug use: No       Review of Systems   Constitutional:  Positive for activity change and fatigue. Negative for appetite change, chills, diaphoresis, fever and unexpected weight change.   HENT:  Positive for mouth sores. Negative for congestion, ear discharge, ear pain, postnasal drip, rhinorrhea and sore throat.    Eyes:  Positive for blurred vision and visual disturbance. Negative for photophobia, pain, discharge, redness and itching.   Respiratory:  Negative for cough, chest tightness, shortness of breath and wheezing.    Cardiovascular:  Negative for chest pain, palpitations and syncope.   Gastrointestinal:  Negative for abdominal pain, nausea and vomiting.   Endocrine: Positive for polydipsia, polyphagia and polyuria.   Genitourinary:  Positive for frequency. Negative for dysuria, hematuria and urgency.   Musculoskeletal:  Negative for arthralgias and gait problem.   Skin:  Positive for color change and rash. Negative for pallor and wound.   Neurological:  Positive for light-headedness. Negative for  dizziness, weakness and headaches.   Psychiatric/Behavioral:  Negative for confusion.    All other systems reviewed and are negative.      Physical Exam  Physical Exam  Vitals and nursing note reviewed.   Constitutional:       General: She is not in acute distress.     Appearance: Normal appearance. She is obese. She is not ill-appearing, toxic-appearing or diaphoretic.   HENT:      Head: Normocephalic and atraumatic.      Right Ear: External ear normal.      Left Ear: External ear normal.      Nose: Nose normal.      Mouth/Throat:      Mouth: Mucous membranes are dry.      Pharynx: No oropharyngeal exudate or posterior oropharyngeal erythema.      Comments: There is an erythematous area that is excoriated over the corner of the right mouth.  This is cheliitis    Cardiovascular:      Rate and Rhythm: Normal rate and regular rhythm.      Heart sounds: Normal heart sounds.   Pulmonary:      Effort: Pulmonary effort is normal.      Breath sounds: Normal breath sounds.   Abdominal:      General: There is no distension.      Palpations: Abdomen is soft.      Tenderness: There is no abdominal tenderness. There is no guarding or rebound.   Musculoskeletal:      Cervical back: Neck supple. No rigidity or tenderness.      Right lower leg: No edema.      Left lower leg: No edema.   Lymphadenopathy:      Cervical: No cervical adenopathy.   Skin:     General: Skin is warm.      Capillary Refill: Capillary refill takes less than 2 seconds.      Findings: Rash present.      Comments: Erythematous excoriated area right corner of the mouth.  Cheilitis   Neurological:      General: No focal deficit present.      Mental Status: She is alert and oriented to person, place, and time.      Gait: Gait normal.   Psychiatric:         Mood and Affect: Mood normal.         Behavior: Behavior normal.         Thought Content: Thought content normal.         Judgment: Judgment normal.         Vital Signs  ED Triage Vitals [08/29/24 1243]    Temperature Pulse Respirations Blood Pressure SpO2   98 °F (36.7 °C) 100 20 (!) 161/109 96 %      Temp Source Heart Rate Source Patient Position - Orthostatic VS BP Location FiO2 (%)   Oral Monitor -- -- --      Pain Score       No Pain           Vitals:    08/29/24 1243   BP: (!) 161/109   Pulse: 100         Visual Acuity      ED Medications  Medications   sodium chloride 0.9 % bolus 2,000 mL (has no administration in time range)       Diagnostic Studies  Results Reviewed       Procedure Component Value Units Date/Time    Blood culture #1 [[id number]]     Lab Status: No result Specimen: Blood     Blood culture #2 [[id number]]     Lab Status: No result Specimen: Blood     Blood gas, venous [[id number]]     Lab Status: No result Specimen: Blood     Beta Hydroxybutyrate [[id number]]     Lab Status: No result Specimen: Blood     CBC and differential [[id number]]     Lab Status: No result Specimen: Blood     Protime-INR [[id number]]     Lab Status: No result Specimen: Blood     APTT [[id number]]     Lab Status: No result Specimen: Blood     Comprehensive metabolic panel [[id number]]     Lab Status: No result Specimen: Blood     Lactic acid, plasma (w/reflex if result > 2.0) [[id number]]     Lab Status: No result Specimen: Blood     TSH, 3rd generation with Free T4 reflex [[id number]]     Lab Status: No result Specimen: Blood     UA w Reflex to Microscopic w Reflex to Culture [[id number]]     Lab Status: No result Specimen: Urine, Clean Catch     POCT pregnancy, urine [[id number]]     Lab Status: No result     HS Troponin 0hr (reflex protocol) [[id number]]     Lab Status: No result Specimen: Blood     Fingerstick Glucose (POCT) [[id number]]  (Abnormal) Collected: 08/29/24 1241    Lab Status: Final result Specimen: Blood Updated: 08/29/24 1246     POC Glucose 429 mg/dl                    XR chest 1 view portable    (Results Pending)   CT head without contrast    (Results Pending)              Procedures  ECG 12 Lead Documentation  Only    Date/Time: 8/29/2024 1:45 PM    Performed by: Julie Lynn Gutzweiler, PA-C  Authorized by: Julie Lynn Gutzweiler, PA-C    Indications / Diagnosis:  Hyperglycemia  ECG reviewed by me, the ED Provider: yes    Patient location:  ED  Previous ECG:     Previous ECG:  Compared to current    Comparison ECG info:  September 3, 2023    Similarity:  No change    Comparison to cardiac monitor: Yes    Interpretation:     Interpretation: normal    Rate:     ECG rate:  94    ECG rate assessment: normal    Rhythm:     Rhythm: sinus rhythm    Ectopy:     Ectopy: none    QRS:     QRS axis:  Normal    QRS intervals:  Normal  Conduction:     Conduction: normal    ST segments:     ST segments:  Normal  T waves:     T waves: normal    Comments:      No acute signs of ischemia    Independently interpreted by me           ED Course  ED Course as of 08/29/24 2002   Thu Aug 29, 2024   1400 Patient refuses to have CT to evaluate her proptosis on her right eye and her blurred vision.   1545 I discussed the left hilar fullness on the chest x-ray and the need to perform further imaging.  Patient agreed to having the CAT scan and then decided that she did not want it done.  She wants to sign out AMA.  She understands her risks of getting seriously ill or dying from her uncontrolled blood sugars.                                               Medical Decision Making  This 43-year-old female presents to the emergency room with a past medical history for diabetes.  She presently complaints of weakness and lightheadedness.  She was at the urgent care center and her blood sugar was high.  It was over 400 so they recommended that she come over to the hospital.  She states that she does not check her sugars at home.  She states that she was on Ozempic and metformin.  She only took 2 doses of the metformin and 1 months worth of the Ozempic and then stopped taking the medications.  This has been over a 8-month.  She denies any coughing, nasal  congestion, postnasal drip, sore throat.  She denies any coughing shortness of breath or chest pain.  She denies any abdominal pain.  She does have excessive thirst and urinates frequently.  She is complaining of blurred vision.  She has an appointment scheduled for September 4 to have an eye exam.  She states that suddenly over the past month she has noticed that she has had a significant change in her vision.  She denies any headaches or double vision.  She denies any abdominal pain, nausea, vomiting, diarrhea or constipation.    Physical exam this 43-year-old obese female who is alert and oriented x 3.  She is no acute distress.  Her mucous membranes are moist.  She has an excoriated erythematous area on the corner of the right mouth.  She states that she has had this for months and it will not go away.  Her posterior pharynx is nonerythematous without exudates.  Her neck is supple upon palpation with no evidence of nuchal rigidity or JVD or lymphadenopathy.  Her lungs are clear to auscultation.  Heart is regular rate and rhythm without murmur.  Her abdomen is protuberant, soft nondistended nontender without mass or hepatosplenomegaly.  She has no peripheral edema.  She does have proptosis on her right eye.  Her pupils are equal reactive.    Differential diagnosis includes but is not limited to DKA, hyperosmolar nonketotic hyperglycemia, infectious process, diabetic retinopathy, stroke, hyperthyroidism, hypothyroidism, acute coronary syndrome.  Hydroxybutyrate was elevated.  Laboratory studies were taken and were reviewed.  She has a blood sugar of 429.  Her gap was 7 and her bicarb was 25.  Gas was 7.44 with a pCO2 of 37.  Portable chest x-ray demonstrates a fullness in the left hilar area.  I wanted to further image at this with a CAT scan but the patient refused to have the images done.  She also refused a CAT scan of the brain.    Impression  Cheilitis  DKA  Left hilar mass    Plan  Signed out AGAINST MEDICAL  ADVICE.  She understood the risks of signing out AGAINST MEDICAL ADVICE with a diagnosis of DKA.  She realizes that she could die from this condition.  She understands that she believes her vision, limb loss, overwhelming infectious process, severe dehydration leading to acute kidney injury/renal failure.  She said that she wants to follow-up with her family physician.  She is going to start back on her metformin.  I told her that she is welcome to come back to the emergency room at any time for admission.        Amount and/or Complexity of Data Reviewed  Labs: ordered.  Radiology: ordered and independent interpretation performed.    Risk  OTC drugs.                 Disposition  Final diagnoses:   None     ED Disposition       None          Follow-up Information    None         Patient's Medications   Discharge Prescriptions    No medications on file       No discharge procedures on file.    PDMP Review       None            ED Provider  Electronically Signed by             Julie Lynn Gutzweiler, PA-C  08/29/24 2007

## 2024-08-29 NOTE — DISCHARGE INSTRUCTIONS
Please start your metformin as previously described at home.    Please see your doctor in 1 day for repeat exam.    He will need to have further imaging on this hilar mass in your chest.  Your doctor can arrange for this as an outpatient.    Keep your appointment with your eye doctor as scheduled on September 4    If your symptoms worsen, we will see you back in the emergency room at any time

## 2024-08-30 LAB
ATRIAL RATE: 94 BPM
P AXIS: 63 DEGREES
PR INTERVAL: 150 MS
QRS AXIS: 54 DEGREES
QRSD INTERVAL: 76 MS
QT INTERVAL: 354 MS
QTC INTERVAL: 442 MS
T WAVE AXIS: 57 DEGREES
VENTRICULAR RATE: 94 BPM

## 2024-08-30 PROCEDURE — 93010 ELECTROCARDIOGRAM REPORT: CPT | Performed by: INTERNAL MEDICINE

## 2024-09-01 LAB
BACTERIA BLD CULT: NORMAL
BACTERIA BLD CULT: NORMAL

## 2024-09-03 LAB
BACTERIA BLD CULT: NORMAL
BACTERIA BLD CULT: NORMAL